# Patient Record
Sex: MALE | Race: WHITE | Employment: OTHER | URBAN - METROPOLITAN AREA
[De-identification: names, ages, dates, MRNs, and addresses within clinical notes are randomized per-mention and may not be internally consistent; named-entity substitution may affect disease eponyms.]

---

## 2019-05-21 ENCOUNTER — TELEPHONE (OUTPATIENT)
Dept: FAMILY MEDICINE CLINIC | Facility: CLINIC | Age: 49
End: 2019-05-21

## 2019-05-21 ENCOUNTER — LAB ENCOUNTER (OUTPATIENT)
Dept: LAB | Age: 49
End: 2019-05-21
Attending: FAMILY MEDICINE
Payer: COMMERCIAL

## 2019-05-21 ENCOUNTER — OFFICE VISIT (OUTPATIENT)
Dept: FAMILY MEDICINE CLINIC | Facility: CLINIC | Age: 49
End: 2019-05-21

## 2019-05-21 VITALS
SYSTOLIC BLOOD PRESSURE: 120 MMHG | TEMPERATURE: 98 F | RESPIRATION RATE: 14 BRPM | WEIGHT: 199.19 LBS | HEART RATE: 76 BPM | HEIGHT: 72 IN | BODY MASS INDEX: 26.98 KG/M2 | DIASTOLIC BLOOD PRESSURE: 62 MMHG

## 2019-05-21 DIAGNOSIS — R63.4 WEIGHT LOSS: ICD-10-CM

## 2019-05-21 DIAGNOSIS — R19.5 CHANGE IN CONSISTENCY OF STOOL: ICD-10-CM

## 2019-05-21 DIAGNOSIS — R19.5 CHANGE IN CONSISTENCY OF STOOL: Primary | ICD-10-CM

## 2019-05-21 PROCEDURE — 80053 COMPREHEN METABOLIC PANEL: CPT

## 2019-05-21 PROCEDURE — 85025 COMPLETE CBC W/AUTO DIFF WBC: CPT

## 2019-05-21 PROCEDURE — 83690 ASSAY OF LIPASE: CPT

## 2019-05-21 PROCEDURE — 99214 OFFICE O/P EST MOD 30 MIN: CPT | Performed by: FAMILY MEDICINE

## 2019-05-21 PROCEDURE — 84443 ASSAY THYROID STIM HORMONE: CPT

## 2019-05-21 NOTE — TELEPHONE ENCOUNTER
Patient reports \"oily stool\" that are more orange in color. Stool change over the last few weeks. Patient now c/o an \"upset stomach\" that feels like acid reflux at times. Belching a lot. Patient is losing weight without trying.  Has been 212 lbs x 5 yea

## 2019-05-21 NOTE — PROGRESS NOTES
Patient presents with:  Weight Loss  Stool: change in color      HPI:   Dania Heart is a 53year old male who presents to the office for eval of weight loss, stool changes. In past, we were concerned about possible achalasia.   Never followed up, never did murmurs, rubs, clicks or gallops  Abdomen - soft, nontender, nondistended, no masses or organomegaly  bowel sounds normal  Extremities: no edema.      Chart reviewed  No prev labs or imaging   ASSESSMENT AND PLAN:     Cassi Langston was seen in the office topraveen

## 2020-12-07 ENCOUNTER — TELEPHONE (OUTPATIENT)
Dept: FAMILY MEDICINE CLINIC | Facility: CLINIC | Age: 50
End: 2020-12-07

## 2020-12-10 NOTE — TELEPHONE ENCOUNTER
Received medical records request from Fransisco Groves D.O. requesting patient's medical records from 01/2014 to present.  All records printed and faxed to Fransisco Groves D.O. at [phone]

## 2021-01-19 ENCOUNTER — OFFICE VISIT (OUTPATIENT)
Dept: FAMILY MEDICINE CLINIC | Facility: CLINIC | Age: 51
End: 2021-01-19

## 2021-01-19 VITALS
SYSTOLIC BLOOD PRESSURE: 122 MMHG | WEIGHT: 207 LBS | HEIGHT: 71.75 IN | TEMPERATURE: 98 F | BODY MASS INDEX: 28.35 KG/M2 | DIASTOLIC BLOOD PRESSURE: 72 MMHG | HEART RATE: 72 BPM

## 2021-01-19 DIAGNOSIS — N52.9 ERECTILE DYSFUNCTION, UNSPECIFIED ERECTILE DYSFUNCTION TYPE: ICD-10-CM

## 2021-01-19 DIAGNOSIS — R13.19 ESOPHAGEAL DYSPHAGIA: ICD-10-CM

## 2021-01-19 DIAGNOSIS — Z00.00 ANNUAL PHYSICAL EXAM: Primary | ICD-10-CM

## 2021-01-19 PROCEDURE — 99396 PREV VISIT EST AGE 40-64: CPT | Performed by: FAMILY MEDICINE

## 2021-01-19 PROCEDURE — 3074F SYST BP LT 130 MM HG: CPT | Performed by: FAMILY MEDICINE

## 2021-01-19 PROCEDURE — 3078F DIAST BP <80 MM HG: CPT | Performed by: FAMILY MEDICINE

## 2021-01-19 PROCEDURE — 3008F BODY MASS INDEX DOCD: CPT | Performed by: FAMILY MEDICINE

## 2021-01-19 PROCEDURE — G0438 PPPS, INITIAL VISIT: HCPCS | Performed by: FAMILY MEDICINE

## 2021-01-19 RX ORDER — TADALAFIL 20 MG/1
20 TABLET ORAL
Qty: 30 TABLET | Refills: 1 | Status: SHIPPED | OUTPATIENT
Start: 2021-01-19

## 2021-01-19 NOTE — PROGRESS NOTES
Patient presents with:  Swallowing: Concerns, Past History of this Issue  Immunization/Injection: Declines Flu Vaccine     HPI:   Dania Heart is a 53year old male who presents for a complete physical exam.     Last colonoscopy:  N/a - at 53.   About eligib History    Tobacco Use      Smoking status: Never Smoker      Smokeless tobacco: Never Used      Tobacco comment: Denied current smoker    Alcohol use:  Yes      Alcohol/week: 0.0 - 2.0 standard drinks      Frequency: 2-4 times a month      Drinks per sessi will defer to the GI team for esophageal studies.   - GASTRO - INTERNAL    3. Erectile dysfunction, unspecified erectile dysfunction type  PRN use. Works by increasing blood flow (dilating the blood vessels). Main side effects are headache, flushing.   So

## 2021-02-06 ENCOUNTER — LAB ENCOUNTER (OUTPATIENT)
Dept: LAB | Facility: HOSPITAL | Age: 51
End: 2021-02-06
Attending: INTERNAL MEDICINE
Payer: COMMERCIAL

## 2021-02-06 DIAGNOSIS — Z01.818 PRE-OP TESTING: ICD-10-CM

## 2021-02-07 LAB — SARS-COV-2 RNA RESP QL NAA+PROBE: NOT DETECTED

## 2021-02-09 PROBLEM — R13.10 DYSPHAGIA: Status: ACTIVE | Noted: 2021-02-09

## 2021-02-09 PROCEDURE — 88305 TISSUE EXAM BY PATHOLOGIST: CPT | Performed by: INTERNAL MEDICINE

## 2021-05-06 ENCOUNTER — IMMUNIZATION (OUTPATIENT)
Dept: LAB | Facility: HOSPITAL | Age: 51
End: 2021-05-06
Attending: EMERGENCY MEDICINE
Payer: COMMERCIAL

## 2021-05-06 DIAGNOSIS — Z23 NEED FOR VACCINATION: Primary | ICD-10-CM

## 2021-05-06 PROCEDURE — 0001A SARSCOV2 VAC 30MCG/0.3ML IM: CPT

## 2022-01-15 ENCOUNTER — IMMUNIZATION (OUTPATIENT)
Dept: LAB | Facility: HOSPITAL | Age: 52
End: 2022-01-15
Attending: EMERGENCY MEDICINE
Payer: COMMERCIAL

## 2022-01-15 DIAGNOSIS — Z23 NEED FOR VACCINATION: Primary | ICD-10-CM

## 2022-01-15 PROCEDURE — 0002A SARSCOV2 VAC 30MCG/0.3ML IM: CPT | Performed by: NURSE PRACTITIONER

## 2022-01-15 PROCEDURE — 0052A SARSCOV2 VAC 30MCG/0.3ML IM: CPT | Performed by: NURSE PRACTITIONER

## 2022-06-27 ENCOUNTER — TELEPHONE (OUTPATIENT)
Dept: FAMILY MEDICINE CLINIC | Facility: CLINIC | Age: 52
End: 2022-06-27

## 2022-06-27 NOTE — TELEPHONE ENCOUNTER
I spoke with Patient he will call us to schedule, he does not need any more calls on this he has to discuss with his wife and give us a return call he needs a physical and labs

## 2022-06-27 NOTE — TELEPHONE ENCOUNTER
Pt is overdue for an annual physical and colon cancer screening. Please contact pt and schedule an office visit. Routed to the front staff, thank you! Gi referral overdue, lov 01/2021.

## 2022-08-29 ENCOUNTER — TELEPHONE (OUTPATIENT)
Dept: FAMILY MEDICINE CLINIC | Facility: CLINIC | Age: 52
End: 2022-08-29

## 2022-08-29 NOTE — TELEPHONE ENCOUNTER
Pt wants to know if he has to have a Colonoscopy for his first one or can he use Cologuard.  He is due for a Colonscopy

## 2022-08-29 NOTE — TELEPHONE ENCOUNTER
Both are options but I would strongly encouraged the first screening via colonoscopy as this will certainly be the most accurate. The risk of doing Cologuard is that if it is abnormal, we have to get a colonoscopy which would then no longer be covered by insurance as it is no longer screening.

## 2024-11-04 ENCOUNTER — HOSPITAL ENCOUNTER (OUTPATIENT)
Dept: CT IMAGING | Age: 54
Discharge: HOME | End: 2024-11-04
Attending: FAMILY MEDICINE

## 2024-11-04 DIAGNOSIS — Z13.6 SCREENING FOR HEART DISEASE: ICD-10-CM

## 2024-11-04 DIAGNOSIS — Z13.9 VISIT FOR SCREENING: ICD-10-CM

## 2025-02-03 LAB
AMB EXT BILIRUBIN, TOTAL: 0.5 MG/DL
AMB EXT BUN: 12 MG/DL
AMB EXT CALCIUM: 9
AMB EXT CARBON DIOXIDE: 24
AMB EXT CHLORIDE: 106
AMB EXT CHOL/HDL RATIO: 4.4
AMB EXT CHOLESTEROL, TOTAL: 186 MG/DL
AMB EXT CMP ALT: 18 U/L
AMB EXT CMP AST: 20 U/L
AMB EXT CREATININE: 1.15 MG/DL
AMB EXT EGFR NON-AA: 76
AMB EXT GLUCOSE: 86 MG/DL
AMB EXT HDL CHOLESTEROL: 42 MG/DL
AMB EXT HEMATOCRIT: 42.6
AMB EXT HEMOGLOBIN: 14.3
AMB EXT LDL CHOLESTEROL, DIRECT: 129 MG/DL
AMB EXT MCV: 90
AMB EXT PLATELETS: 230
AMB EXT POSTASSIUM: 4.3 MMOL/L
AMB EXT SODIUM: 141 MMOL/L
AMB EXT TOTAL PROTEIN: 6.7
AMB EXT TRIGLYCERIDES: 81 MG/DL
AMB EXT WBC: 6.6 X10(3)UL

## 2025-05-21 ENCOUNTER — OFFICE VISIT (OUTPATIENT)
Dept: FAMILY MEDICINE CLINIC | Facility: CLINIC | Age: 55
End: 2025-05-21
Payer: COMMERCIAL

## 2025-05-21 VITALS
SYSTOLIC BLOOD PRESSURE: 90 MMHG | WEIGHT: 204 LBS | OXYGEN SATURATION: 100 % | DIASTOLIC BLOOD PRESSURE: 60 MMHG | HEART RATE: 70 BPM | TEMPERATURE: 98 F | BODY MASS INDEX: 27.63 KG/M2 | HEIGHT: 72 IN

## 2025-05-21 DIAGNOSIS — N52.9 ERECTILE DYSFUNCTION, UNSPECIFIED ERECTILE DYSFUNCTION TYPE: ICD-10-CM

## 2025-05-21 DIAGNOSIS — K20.0 EOSINOPHILIC ESOPHAGITIS: ICD-10-CM

## 2025-05-21 DIAGNOSIS — Z00.00 ANNUAL PHYSICAL EXAM: Primary | ICD-10-CM

## 2025-05-21 PROCEDURE — 99386 PREV VISIT NEW AGE 40-64: CPT | Performed by: FAMILY MEDICINE

## 2025-05-21 RX ORDER — SILDENAFIL 100 MG/1
100 TABLET, FILM COATED ORAL
Qty: 30 TABLET | Refills: 1 | Status: SHIPPED | OUTPATIENT
Start: 2025-05-21

## 2025-05-21 NOTE — PROGRESS NOTES
Chief Complaint   Patient presents with    Physical     Physical,re-establish care.  ED medication.   No other complaints      HPI:   Chidi De La Curz is a 54 year old male who presents for a complete physical exam. New patient, as LOV 4 yrs ago.     Last colonoscopy:  9/19/2023.  Repeat 10 yrs .    Last PSA:  none on record.   Immunizations: none recently.      GI - eosinophilic esophagitis.  [facility] and Dr. Douglass.  Needs to make sure he chews well before swallowing.  Tends to happen more with steak or chicken - sensation of food getting stuck.  Was on omeprazole for a stretch, but off now.      ED - takes tadalafil from time to time.      Wt Readings from Last 6 Encounters:   05/21/25 204 lb (92.5 kg)   02/03/21 205 lb (93 kg)   01/19/21 207 lb (93.9 kg)   05/21/19 199 lb 3.2 oz (90.4 kg)   10/21/16 204 lb (92.5 kg)   02/19/16 209 lb (94.8 kg)     Body mass index is 27.67 kg/m².     Chemistry Labs:   Lab Results   Component Value Date/Time    GLU 86 02/03/2025 12:00 AM     05/21/2019 11:56 AM    K 4.3 02/03/2025 12:00 AM     02/03/2025 12:00 AM    CO2 24 02/03/2025 12:00 AM    CREATSERUM 1.15 02/03/2025 12:00 AM    CA 9.0 02/03/2025 12:00 AM    ALB 3.9 05/21/2019 11:56 AM    TP 6.7 02/03/2025 12:00 AM    ALKPHO 77 05/21/2019 11:56 AM    AST 20 02/03/2025 12:00 AM    ALT 18 02/03/2025 12:00 AM    BILT 0.5 02/03/2025 12:00 AM          Cholesterol  (most recent labs)   Lab Results   Component Value Date/Time    CHOLEST 186 02/03/2025 12:00 AM    HDL 42 02/03/2025 12:00 AM    TRIG 81 02/03/2025 12:00 AM      No results found for: \"PSA\"      Current Medications[1]   Past Medical History[2]   Past Surgical History[3]   Family History[4]   Social History:  Short Social Hx on File[5]     Occ: commodities trading.   : yes. Children: senior in college, freshman at school. Oldest is , and has a child.   Exercise: pretty regular.  Lifting, and cardio.   Diet: healthy.       REVIEW OF SYSTEMS:     All systems reviewed, negative other than noted above.    EXAM:   BP 90/60 (BP Location: Left arm, Patient Position: Sitting, Cuff Size: large)   Pulse 70   Temp 98 °F (36.7 °C) (Oral)   Ht 6' (1.829 m)   Wt 204 lb (92.5 kg)   SpO2 100%   BMI 27.67 kg/m²   Body mass index is 27.67 kg/m².     General appearance: alert, appears stated age and cooperative  Eyes: conjunctivae/corneas clear. PERRL, EOM's intact.   Ears: normal TM's and external ear canals both ears  Neck: no adenopathy, no JVD, supple, symmetrical, trachea midline and thyroid not enlarged, symmetric, no tenderness/mass/nodules  Lungs: clear to auscultation bilaterally  Heart: S1, S2 normal, no murmur, click, rub or gallop, regular rate and rhythm  Abdomen: soft, non-tender; bowel sounds normal; no masses,  no organomegaly  Extremities: extremities normal, atraumatic, no cyanosis or edema  Pulses: 2+ and symmetric  Neurologic: Alert and oriented X 3, normal strength and tone. Normal symmetric reflexes. Normal coordination and gait     ASSESSMENT AND PLAN:     Chidi De La Cruz was seen in the office today:  had concerns including Physical (Physical,re-establish care.  ED medication.   No other complaints).    1. Annual physical exam  Overall well  Healthy diet and exercise  Labs reassuring year over a year.  Advised him next time to get the PSA if he can remember to do it  Colon screening up-to-date.  Will abstract into the chart    2. Erectile dysfunction, unspecified erectile dysfunction type  Medicine refilled.  Effective when needed  - Sildenafil Citrate 100 MG Oral Tab; Take 1 tablet (100 mg total) by mouth daily as needed for Erectile Dysfunction.  Dispense: 30 tablet; Refill: 1    3. Eosinophilic esophagitis  Noted on EGD in 2021 and in 2023.  Dilations helped his symptoms.  Never really took the omeprazole regularly.  I encouraged him to consider doing this.  The standard of care for this condition is acid suppression  as well as occasional dilations  He is aware.  Will consider        Yovani Benavidez M.D.   EM[facility] 3  05/21/25        Note to patient: The 21 Century Cures Act makes medical notes like these available to patients in the interest of transparency. However, be advised this is a medical document. It is intended as peer to peer communication. It is written in medical language and may contain abbreviations or verbiage that are unfamiliar. It may appear blunt or direct. Medical documents are intended to carry relevant information, facts as evident, and the clinical opinion of the practitioner.            [1]   Current Outpatient Medications   Medication Sig Dispense Refill    Sildenafil Citrate 100 MG Oral Tab Take 1 tablet (100 mg total) by mouth daily as needed for Erectile Dysfunction. 30 tablet 1   [2]   Past Medical History:   Disorder of prostate    Maybe - typical more frequent urination and weaker flow    Hemorrhoids    Problems with swallowing   [3]   Past Surgical History:  Procedure Laterality Date    Extraction, erupted tooth or exposed root  1988    Vasectomy     [4]   Family History  Family history unknown: Yes   Problem Relation Age of Onset    Family history unknown: Yes    Other Father         NO Family history - ADOPTED   [5]   Social History  Socioeconomic History    Marital status:    Occupational History    Occupation:     Tobacco Use    Smoking status: Never    Smokeless tobacco: Never    Tobacco comments:     Denied current smoker   Vaping Use    Vaping status: Never Used   Substance and Sexual Activity    Alcohol use: Yes     Alcohol/week: 0.0 - 2.0 standard drinks of alcohol     Comment: Light socially 1-2 / week    Drug use: No   Other Topics Concern    Caffeine Concern No    Exercise Yes     Comment: Weights and cardio, 5x week, 45 mins    Seat Belt Yes     Social Drivers of Health     Food Insecurity: No Food Insecurity (5/21/2025)    NCSS - Food Insecurity     Worried  About Running Out of Food in the Last Year: No     Ran Out of Food in the Last Year: No   Transportation Needs: No Transportation Needs (5/21/2025)    NCSS - Transportation     Lack of Transportation: No   Housing Stability: Not At Risk (5/21/2025)    NCSS - Housing/Utilities     Has Housing: Yes     Worried About Losing Housing: No     Unable to Get Utilities: No